# Patient Record
Sex: MALE | Race: WHITE | NOT HISPANIC OR LATINO | Employment: FULL TIME | URBAN - METROPOLITAN AREA
[De-identification: names, ages, dates, MRNs, and addresses within clinical notes are randomized per-mention and may not be internally consistent; named-entity substitution may affect disease eponyms.]

---

## 2023-04-28 ENCOUNTER — HOSPITAL ENCOUNTER (EMERGENCY)
Facility: HOSPITAL | Age: 43
Discharge: HOME | End: 2023-04-28
Attending: STUDENT IN AN ORGANIZED HEALTH CARE EDUCATION/TRAINING PROGRAM

## 2023-04-28 VITALS
WEIGHT: 315 LBS | TEMPERATURE: 99 F | OXYGEN SATURATION: 98 % | HEART RATE: 88 BPM | HEIGHT: 69 IN | RESPIRATION RATE: 18 BRPM | DIASTOLIC BLOOD PRESSURE: 91 MMHG | BODY MASS INDEX: 46.65 KG/M2 | SYSTOLIC BLOOD PRESSURE: 145 MMHG

## 2023-04-28 DIAGNOSIS — R52 PAIN: ICD-10-CM

## 2023-04-28 DIAGNOSIS — M10.9 ACUTE GOUT OF LEFT FOOT, UNSPECIFIED CAUSE: Primary | ICD-10-CM

## 2023-04-28 LAB
BASOPHILS # BLD AUTO: 0.05 X10(3)/MCL (ref 0–0.2)
BASOPHILS NFR BLD AUTO: 0.5 %
EOSINOPHIL # BLD AUTO: 0.09 X10(3)/MCL (ref 0–0.9)
EOSINOPHIL NFR BLD AUTO: 0.8 %
ERYTHROCYTE [DISTWIDTH] IN BLOOD BY AUTOMATED COUNT: 14.2 % (ref 11.5–17)
ERYTHROCYTE [SEDIMENTATION RATE] IN BLOOD: 12 MM/HR (ref 0–15)
HCT VFR BLD AUTO: 43.6 % (ref 42–52)
HGB BLD-MCNC: 13.9 G/DL (ref 14–18)
IMM GRANULOCYTES # BLD AUTO: 0.03 X10(3)/MCL (ref 0–0.04)
IMM GRANULOCYTES NFR BLD AUTO: 0.3 %
LYMPHOCYTES # BLD AUTO: 3.42 X10(3)/MCL (ref 0.6–4.6)
LYMPHOCYTES NFR BLD AUTO: 31 %
MCH RBC QN AUTO: 27.3 PG (ref 27–31)
MCHC RBC AUTO-ENTMCNC: 31.9 G/DL (ref 33–36)
MCV RBC AUTO: 85.5 FL (ref 80–94)
MONOCYTES # BLD AUTO: 0.71 X10(3)/MCL (ref 0.1–1.3)
MONOCYTES NFR BLD AUTO: 6.4 %
NEUTROPHILS # BLD AUTO: 6.74 X10(3)/MCL (ref 2.1–9.2)
NEUTROPHILS NFR BLD AUTO: 61 %
PLATELET # BLD AUTO: 274 X10(3)/MCL (ref 130–400)
PMV BLD AUTO: 10.7 FL (ref 7.4–10.4)
RBC # BLD AUTO: 5.1 X10(6)/MCL (ref 4.7–6.1)
URATE SERPL-MCNC: 7.9 MG/DL (ref 3.5–7.2)
WBC # SPEC AUTO: 11 X10(3)/MCL (ref 4.5–11.5)

## 2023-04-28 PROCEDURE — 96372 THER/PROPH/DIAG INJ SC/IM: CPT | Performed by: NURSE PRACTITIONER

## 2023-04-28 PROCEDURE — 85651 RBC SED RATE NONAUTOMATED: CPT | Performed by: NURSE PRACTITIONER

## 2023-04-28 PROCEDURE — 99284 EMERGENCY DEPT VISIT MOD MDM: CPT

## 2023-04-28 PROCEDURE — 84550 ASSAY OF BLOOD/URIC ACID: CPT | Performed by: NURSE PRACTITIONER

## 2023-04-28 PROCEDURE — 63600175 PHARM REV CODE 636 W HCPCS: Performed by: NURSE PRACTITIONER

## 2023-04-28 PROCEDURE — 85025 COMPLETE CBC W/AUTO DIFF WBC: CPT | Performed by: NURSE PRACTITIONER

## 2023-04-28 RX ORDER — PREDNISONE 20 MG/1
40 TABLET ORAL DAILY
Qty: 10 TABLET | Refills: 0 | Status: SHIPPED | OUTPATIENT
Start: 2023-04-28 | End: 2023-05-03

## 2023-04-28 RX ORDER — KETOROLAC TROMETHAMINE 30 MG/ML
60 INJECTION, SOLUTION INTRAMUSCULAR; INTRAVENOUS
Status: COMPLETED | OUTPATIENT
Start: 2023-04-28 | End: 2023-04-28

## 2023-04-28 RX ORDER — DEXAMETHASONE SODIUM PHOSPHATE 4 MG/ML
4 INJECTION, SOLUTION INTRA-ARTICULAR; INTRALESIONAL; INTRAMUSCULAR; INTRAVENOUS; SOFT TISSUE
Status: COMPLETED | OUTPATIENT
Start: 2023-04-28 | End: 2023-04-28

## 2023-04-28 RX ORDER — COLCHICINE 0.6 MG/1
1.2 TABLET ORAL DAILY
Qty: 3 TABLET | Refills: 0 | Status: SHIPPED | OUTPATIENT
Start: 2023-04-28

## 2023-04-28 RX ADMIN — DEXAMETHASONE SODIUM PHOSPHATE 4 MG: 4 INJECTION, SOLUTION INTRA-ARTICULAR; INTRALESIONAL; INTRAMUSCULAR; INTRAVENOUS; SOFT TISSUE at 06:04

## 2023-04-28 RX ADMIN — KETOROLAC TROMETHAMINE 60 MG: 60 INJECTION, SOLUTION INTRAMUSCULAR at 06:04

## 2023-04-29 NOTE — ED PROVIDER NOTES
Encounter Date: 4/28/2023       History     Chief Complaint   Patient presents with    Foot Pain     Pt c/o swelling and pain to ball of left foot starting this am.     43-year-old male presents emerged department complaints of nontraumatic left 2nd 3rd toe pain and swelling that started this morning.  Patient denies any injury trauma.  He denies any fevers chills.  He states pain is a sharp and throbbing pain that is worse with weight-bearing and alleviated some by rest.  He denies any other complaints or associated symptoms.    Review of patient's allergies indicates:  No Known Allergies  No past medical history on file.  No past surgical history on file.  No family history on file.     Review of Systems   Constitutional:  Negative for activity change, appetite change and fever.   HENT:  Negative for congestion, dental problem and sore throat.    Respiratory:  Negative for apnea, chest tightness and shortness of breath.    Cardiovascular:  Negative for chest pain.   Gastrointestinal:  Negative for nausea.   Genitourinary:  Negative for dysuria.   Musculoskeletal:  Negative for back pain.   Skin:  Negative for color change, rash and wound.   Neurological:  Negative for dizziness, facial asymmetry and weakness.   Hematological:  Does not bruise/bleed easily.   All other systems reviewed and are negative.    Physical Exam     Initial Vitals [04/28/23 1749]   BP Pulse Resp Temp SpO2   (!) 151/103 94 18 98.5 °F (36.9 °C) 96 %      MAP       --         Physical Exam    Nursing note and vitals reviewed.  Constitutional: Vital signs are normal. He appears well-developed and well-nourished.  Non-toxic appearance. He does not have a sickly appearance.   HENT:   Head: Normocephalic and atraumatic.   Eyes: Conjunctivae, EOM and lids are normal. Lids are everted and swept, no foreign bodies found.   Neck: Trachea normal and phonation normal. Neck supple. No thyroid mass and no thyromegaly present.   Normal range of motion.    Full passive range of motion without pain.     Cardiovascular:  Normal rate, regular rhythm, S1 normal, S2 normal, normal heart sounds, intact distal pulses and normal pulses.           Musculoskeletal:         General: Tenderness present.      Cervical back: Full passive range of motion without pain, normal range of motion and neck supple.     Lymphadenopathy:     He has no cervical adenopathy.   Neurological: He is alert and oriented to person, place, and time. He has normal strength. GCS score is 15. GCS eye subscore is 4. GCS verbal subscore is 5. GCS motor subscore is 6.   Skin: Skin is warm, dry and intact. Capillary refill takes less than 2 seconds. There is erythema.   Skin to 2nd 3rd toe to left is erythematous but there is no heat differences between the left and right foot.  There is no major edema noted.   Psychiatric: He has a normal mood and affect. His speech is normal and behavior is normal. Judgment normal. Cognition and memory are normal.       ED Course   Procedures  Labs Reviewed   URIC ACID - Abnormal; Notable for the following components:       Result Value    Uric Acid 7.9 (*)     All other components within normal limits   CBC WITH DIFFERENTIAL - Abnormal; Notable for the following components:    Hgb 13.9 (*)     MCHC 31.9 (*)     MPV 10.7 (*)     All other components within normal limits   SEDIMENTATION RATE, AUTOMATED - Normal   CBC W/ AUTO DIFFERENTIAL    Narrative:     The following orders were created for panel order CBC auto differential.  Procedure                               Abnormality         Status                     ---------                               -----------         ------                     CBC with Differential[[id number]]        Abnormal            Final result                 Please view results for these tests on the individual orders.          Imaging Results              X-Ray Foot Complete Left (Final result)  Result time 04/28/23 18:53:30      Final result  by Jonn Burciaga MD (04/28/23 18:53:30)                   Impression:      No acute abnormality of the left foot.      Electronically signed by: Jonn Burciaga MD  Date:    04/28/2023  Time:    18:53               Narrative:    EXAMINATION:  Three radiographic views of the LEFT FOOT.    CLINICAL HISTORY:  Pain, unspecified    TECHNIQUE:  Three radiographic views of the LEFT FOOT.    COMPARISON:  None.    FINDINGS:  Three views of the left foot demonstrate normal alignment.  There is no fracture.  There is no bony mass lesion.  There is no soft tissue swelling.                                       Medications   ketorolac injection 60 mg (60 mg Intramuscular Given 4/28/23 1825)   dexAMETHasone injection 4 mg (4 mg Intramuscular Given 4/28/23 1825)                       Medical Decision Making  43-year-old male with nontraumatic left 2nd 3rd toe pain.    Problems Addressed:  Acute gout of left foot, unspecified cause: acute illness or injury     Details: Patient has elevated uric acid in signs and symptoms of gout.  IM medications given here and p.o. medicines sent home to continue for symptoms.  Discussed getting established with PCP for follow-up.  Discussed including paperwork for Rena EDOUARD for follow-up.  Patient was aware plan of care and had no other questions or concerns prior to discharge.    Amount and/or Complexity of Data Reviewed  External Data Reviewed: labs and notes.  Labs: ordered. Decision-making details documented in ED Course.  Radiology: ordered. Decision-making details documented in ED Course.            Clinical Impression:   Final diagnoses:  [R52] Pain  [M10.9] Acute gout of left foot, unspecified cause (Primary)        ED Disposition Condition    Discharge Stable          ED Prescriptions       Medication Sig Dispense Start Date End Date Auth. Provider    predniSONE (DELTASONE) 20 MG tablet Take 2 tablets (40 mg total) by mouth once daily. for 5 days 10 tablet 4/28/2023 5/3/2023 Berny BANERJEE  SILKE Dasilva    colchicine (COLCRYS) 0.6 mg tablet Take 2 tablets (1.2 mg total) by mouth once daily. Take 2 tablets x1 dose then may repeat 1 single dose of 0.6 mg p.o. 1 hour later x1 dose. 3 tablet 4/28/2023 -- SILKE Lopez          Follow-up Information    None          SILKE Lopez  04/28/23 1920

## 2024-12-04 ENCOUNTER — HOSPITAL ENCOUNTER (EMERGENCY)
Facility: HOSPITAL | Age: 44
Discharge: HOME | End: 2024-12-04
Attending: INTERNAL MEDICINE

## 2024-12-04 VITALS
RESPIRATION RATE: 18 BRPM | HEART RATE: 78 BPM | TEMPERATURE: 98 F | HEIGHT: 68 IN | DIASTOLIC BLOOD PRESSURE: 78 MMHG | BODY MASS INDEX: 47.74 KG/M2 | WEIGHT: 315 LBS | SYSTOLIC BLOOD PRESSURE: 130 MMHG | OXYGEN SATURATION: 99 %

## 2024-12-04 DIAGNOSIS — R05.9 COUGH: ICD-10-CM

## 2024-12-04 DIAGNOSIS — J02.9 VIRAL PHARYNGITIS: Primary | ICD-10-CM

## 2024-12-04 LAB
FLUAV AG UPPER RESP QL IA.RAPID: NOT DETECTED
FLUBV AG UPPER RESP QL IA.RAPID: NOT DETECTED
SARS-COV-2 RNA RESP QL NAA+PROBE: NOT DETECTED
STREP A PCR (OHS): NOT DETECTED

## 2024-12-04 PROCEDURE — 25000003 PHARM REV CODE 250: Performed by: PHYSICIAN ASSISTANT

## 2024-12-04 PROCEDURE — 87651 STREP A DNA AMP PROBE: CPT | Performed by: PHYSICIAN ASSISTANT

## 2024-12-04 PROCEDURE — 99283 EMERGENCY DEPT VISIT LOW MDM: CPT | Mod: 25

## 2024-12-04 PROCEDURE — 0240U COVID/FLU A&B PCR: CPT | Performed by: PHYSICIAN ASSISTANT

## 2024-12-04 RX ORDER — IBUPROFEN 800 MG/1
800 TABLET ORAL EVERY 6 HOURS PRN
Qty: 20 TABLET | Refills: 0 | Status: SHIPPED | OUTPATIENT
Start: 2024-12-04

## 2024-12-04 RX ORDER — FLUTICASONE PROPIONATE 50 MCG
1 SPRAY, SUSPENSION (ML) NASAL 2 TIMES DAILY PRN
Qty: 15 G | Refills: 0 | Status: SHIPPED | OUTPATIENT
Start: 2024-12-04

## 2024-12-04 RX ORDER — CETIRIZINE HYDROCHLORIDE 10 MG/1
10 TABLET ORAL DAILY
Qty: 30 TABLET | Refills: 0 | Status: SHIPPED | OUTPATIENT
Start: 2024-12-04 | End: 2025-01-03

## 2024-12-04 RX ORDER — KETOROLAC TROMETHAMINE 10 MG/1
10 TABLET, FILM COATED ORAL
Status: COMPLETED | OUTPATIENT
Start: 2024-12-04 | End: 2024-12-04

## 2024-12-04 RX ADMIN — KETOROLAC TROMETHAMINE 10 MG: 10 TABLET, FILM COATED ORAL at 07:12

## 2024-12-04 NOTE — Clinical Note
"Zeferino HOLCOMB"Perfecto was seen and treated in our emergency department on 12/4/2024.  He may return to work on 12/07/2024.       If you have any questions or concerns, please don't hesitate to call.      Ivelisse Painting PA"

## 2024-12-05 NOTE — ED PROVIDER NOTES
Encounter Date: 12/4/2024       History     Chief Complaint   Patient presents with    Cough     C/O cough w/ fever x 4-5 days. Resp E/U. Skin W/D. NADN.      44-year-old male presents to ED for evaluation of cough and congestion over the past 4-5 days.  Patient reports fever at home.  States he has a fever of 102 yesterday.  Complains of sore throat.  Denies any shortness of breath or chest pain.  No medications taken at home.    The history is provided by the patient. No  was used.     Review of patient's allergies indicates:  No Known Allergies  History reviewed. No pertinent past medical history.  No past surgical history on file.  No family history on file.     Review of Systems   Constitutional:  Positive for fatigue and fever.   HENT:  Positive for congestion and sore throat. Negative for ear pain and rhinorrhea.    Respiratory:  Positive for cough. Negative for shortness of breath and wheezing.    Cardiovascular:  Negative for chest pain.   Gastrointestinal:  Negative for abdominal pain, nausea and vomiting.   Musculoskeletal:  Negative for myalgias.   Neurological:  Negative for headaches.   All other systems reviewed and are negative.      Physical Exam     Initial Vitals [12/04/24 1915]   BP Pulse Resp Temp SpO2   -- 82 20 98.5 °F (36.9 °C) 98 %      MAP       --         Physical Exam    Nursing note and vitals reviewed.  Constitutional: He appears well-developed and well-nourished. He is cooperative.   HENT:   Head: Normocephalic and atraumatic.   Right Ear: Tympanic membrane and external ear normal.   Left Ear: Tympanic membrane and external ear normal. Mouth/Throat: Uvula is midline, oropharynx is clear and moist and mucous membranes are normal. No trismus in the jaw. No uvula swelling.   Eyes: Conjunctivae are normal. Pupils are equal, round, and reactive to light.   Neck: Neck supple.   Normal range of motion.  Cardiovascular:  Normal rate, regular rhythm and normal heart sounds.            Pulmonary/Chest: Breath sounds normal. No respiratory distress. He has no wheezes. He has no rhonchi. He has no rales.   Abdominal: Abdomen is soft. Bowel sounds are normal. There is no abdominal tenderness.   Musculoskeletal:         General: Normal range of motion.      Cervical back: Normal range of motion and neck supple.     Neurological: He is alert and oriented to person, place, and time.   Skin: Skin is warm and dry. Capillary refill takes less than 2 seconds.   Psychiatric: He has a normal mood and affect.         ED Course   Procedures  Labs Reviewed   COVID/FLU A&B PCR - Normal       Result Value    Influenza A PCR Not Detected      Influenza B PCR Not Detected      SARS-CoV-2 PCR Not Detected      Narrative:     The Xpert Xpress SARS-CoV-2/FLU/RSV plus is a rapid, multiplexed real-time PCR test intended for the simultaneous qualitative detection and differentiation of SARS-CoV-2, Influenza A, Influenza B, and respiratory syncytial virus (RSV) viral RNA in either nasopharyngeal swab or nasal swab specimens.         STREP GROUP A BY PCR - Normal    STREP A PCR ([facility]) Not Detected      Narrative:     The Xpert Xpress Strep A test is a rapid, qualitative in vitro diagnostic test for the detection of Streptococcus pyogenes (Group A ß-hemolytic Streptococcus, Strep A) in throat swab specimens from patients with signs and symptoms of pharyngitis.            Imaging Results              X-Ray Chest PA And Lateral (Preliminary result)  Result time 12/04/24 20:14:48      Wet Read by Ivelisse Painting PA (12/04/24 20:14:48, [facility] Emergency Dept, Emergency Medicine)    No acute findings noted.                                      Medications   ketorolac tablet 10 mg (10 mg Oral Given 12/4/24 1929)     Medical Decision Making   44-year-old male presents to ED for evaluation of cough and congestion over the past 4-5 days.  Patient reports fever at home.  States he has a fever of 102 yesterday.   Complains of sore throat.  Denies any shortness of breath or chest pain.  No medications taken at home.    Differential diagnosis includes but isn't limited to Viral syndrome, COVID, flu, strep, sinusitis, bronchitis      Amount and/or Complexity of Data Reviewed  Labs: ordered. Decision-making details documented in ED Course.  Radiology: ordered and independent interpretation performed.  Discussion of management or test interpretation with external provider(s):   Patient afebrile and in no acute distress presents to ED for evaluation of URI like symptoms.  COVID and flu negative.  Strep negative.  X-ray obtained showing no acute pneumonia.  Will treat symptomatically at this time.  Patient requesting work excuse.  Will give a few days have follow up with PCP for further evaluation.    Risk  OTC drugs.  Prescription drug management.               ED Course as of 12/04/24 2020   Wed Dec 04, 2024   2003 STREP A PCR ([facility]): Not Detected [SL]   2011 Influenza A, Molecular: Not Detected [SL]   2011 Influenza B, Molecular: Not Detected [SL]   2011 SARS-CoV2 (COVID-19) Qualitative PCR: Not Detected [SL]      ED Course User Index  [SL] Ivelisse Painting PA                           Clinical Impression:  Final diagnoses:  [R05.9] Cough  [J02.9] Viral pharyngitis (Primary)          ED Disposition Condition    Discharge Stable          ED Prescriptions       Medication Sig Dispense Start Date End Date Auth. Provider    ibuprofen (ADVIL,MOTRIN) 800 MG tablet Take 1 tablet (800 mg total) by mouth every 6 (six) hours as needed for Pain. 20 tablet 12/4/2024 -- Ivelisse Painting PA    fluticasone propionate (FLONASE) 50 mcg/actuation nasal spray 1 spray (50 mcg total) by Each Nostril route 2 (two) times daily as needed for Rhinitis. 15 g 12/4/2024 -- Ivelisse Painting PA    cetirizine (ZYRTEC) 10 MG tablet Take 1 tablet (10 mg total) by mouth once daily. 30 tablet 12/4/2024 1/3/2025 Ivelisse Painting PA          Follow-up Information       Follow up With  Specialties Details Why Contact Info    PCP  In 1 week As needed, If you do not have a PCP you may call [phone] to help get set up If you do not have a PCP you may call [phone] to help get set up.             Ivelisse Painting PA  12/04/24 2020

## 2025-04-16 ENCOUNTER — HOSPITAL ENCOUNTER (EMERGENCY)
Facility: HOSPITAL | Age: 45
Discharge: HOME | End: 2025-04-16
Attending: INTERNAL MEDICINE

## 2025-04-16 VITALS
SYSTOLIC BLOOD PRESSURE: 136 MMHG | TEMPERATURE: 98 F | BODY MASS INDEX: 45.1 KG/M2 | DIASTOLIC BLOOD PRESSURE: 92 MMHG | OXYGEN SATURATION: 98 % | HEIGHT: 70 IN | RESPIRATION RATE: 19 BRPM | WEIGHT: 315 LBS | HEART RATE: 89 BPM

## 2025-04-16 DIAGNOSIS — M25.562 LEFT KNEE PAIN: ICD-10-CM

## 2025-04-16 PROCEDURE — 99284 EMERGENCY DEPT VISIT MOD MDM: CPT | Mod: 25

## 2025-04-16 PROCEDURE — 25000003 PHARM REV CODE 250: Performed by: PHYSICIAN ASSISTANT

## 2025-04-16 RX ORDER — DICLOFENAC SODIUM 50 MG/1
50 TABLET, DELAYED RELEASE ORAL 3 TIMES DAILY
Qty: 21 TABLET | Refills: 0 | Status: SHIPPED | OUTPATIENT
Start: 2025-04-16 | End: 2025-04-23

## 2025-04-16 RX ORDER — KETOROLAC TROMETHAMINE 10 MG/1
10 TABLET, FILM COATED ORAL
Status: COMPLETED | OUTPATIENT
Start: 2025-04-16 | End: 2025-04-16

## 2025-04-16 RX ORDER — METHOCARBAMOL 750 MG/1
750 TABLET, FILM COATED ORAL 3 TIMES DAILY
Qty: 21 TABLET | Refills: 0 | Status: SHIPPED | OUTPATIENT
Start: 2025-04-16 | End: 2025-04-23

## 2025-04-16 RX ADMIN — KETOROLAC TROMETHAMINE 10 MG: 10 TABLET, FILM COATED ORAL at 06:04

## 2025-04-16 NOTE — ED PROVIDER NOTES
Encounter Date: 4/16/2025       History     Chief Complaint   Patient presents with    Knee Pain     Pt c/o left knee pain x one month, no injury reported.     45-year-old male presents to ED for evaluation of left knee pain over the last month.  Patient denies any trauma or injury.  Patient states he took Tylenol at home without relief.  States that his pain in his not getting any better.  States that he does not follow up with the primary care doctor.  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    The history is provided by the patient. No  was used.     Review of patient's allergies indicates:  No Known Allergies  History reviewed. No pertinent past medical history.  History reviewed. No pertinent surgical history.  No family history on file.  Social History[1]  Review of Systems   Constitutional:  Negative for chills, fatigue and fever.   HENT:  Negative for sore throat.    Respiratory:  Negative for cough and shortness of breath.    Cardiovascular:  Negative for chest pain.   Gastrointestinal:  Negative for abdominal pain, nausea and vomiting.   Genitourinary:  Negative for dysuria and frequency.   Musculoskeletal:  Positive for arthralgias, joint swelling and myalgias. Negative for back pain and neck pain.   Skin:  Negative for rash.   Neurological:  Negative for dizziness, weakness and headaches.   Hematological:  Does not bruise/bleed easily.   All other systems reviewed and are negative.      Physical Exam     Initial Vitals [04/16/25 1754]   BP Pulse Resp Temp SpO2   (!) 138/96 (!) 115 20 98.1 °F (36.7 °C) 97 %      MAP       --         Physical Exam    Nursing note and vitals reviewed.  Constitutional: He appears well-developed. He is cooperative.   HENT:   Head: Normocephalic and atraumatic.   Right Ear: Tympanic membrane and external ear normal.   Left Ear: Tympanic membrane and external ear normal. Mouth/Throat: Uvula is midline, oropharynx is clear  and moist and mucous membranes are normal. No trismus in the jaw. No uvula swelling.   Eyes: Conjunctivae are normal. Pupils are equal, round, and reactive to light.   Neck: Neck supple.   Normal range of motion.  Cardiovascular:  Normal rate, regular rhythm and normal heart sounds.           Pulmonary/Chest: Breath sounds normal. No respiratory distress. He has no wheezes. He has no rhonchi. He has no rales.   Abdominal: Abdomen is soft. Bowel sounds are normal. There is no abdominal tenderness.   Musculoskeletal:         General: Normal range of motion.      Cervical back: Normal range of motion and neck supple.      Left knee: No swelling or deformity. Normal range of motion. Tenderness present.      Comments: DP pulses 2+ all other adjacent joints negative.     Neurological: He is alert and oriented to person, place, and time. He has normal strength. No cranial nerve deficit or sensory deficit. GCS score is 15. GCS eye subscore is 4. GCS verbal subscore is 5. GCS motor subscore is 6.   Skin: Skin is warm and dry. Capillary refill takes less than 2 seconds.   Psychiatric: He has a normal mood and affect.         ED Course   Procedures  Labs Reviewed - No data to display       Imaging Results              X-Ray Knee Complete 4 or More Views Left (Preliminary result)  Result time 04/16/25 19:08:34      Wet Read by Ivelisse Painting PA (04/16/25 19:08:34, [facility])    No acute fracture or dislocation noted                                     Medications   ketorolac tablet 10 mg (10 mg Oral Given 4/16/25 8971)     Medical Decision Making  45-year-old male presents to ED for evaluation of left knee pain over the last month.  Patient denies any trauma or injury.  Patient states he took Tylenol at home without relief.  States that his pain in his not getting any better.  States that he does not follow up with the primary care doctor.  Reports that he is driving and can not  take any narcotic pain medication.    Differential diagnosis includes but isn't limited to knee pain, internal knee injury, sprain, strain, degenerative joint patient    Amount and/or Complexity of Data Reviewed  Radiology: ordered.  Discussion of management or test interpretation with external provider(s): Patient GCS 15 and neuro intact.  Ambulatory with steady gait.  Presents to ED for evaluation of atraumatic left knee pain.  Patient states it has been ongoing for the last month.  Does not see if PCP.  Has been taking Tylenol without relief.  X-ray obtained without any acute findings.  Will discharge home with short course of symptomatic care.  Discussed with patient need for follow-up for further evaluation as needed.  Patient verbalizes understanding and agrees with plan of care.    Risk  OTC drugs.  Prescription drug management.                                      Clinical Impression:  Final diagnoses:  [M25.562] Left knee pain          ED Disposition Condition    Discharge Stable          ED Prescriptions       Medication Sig Dispense Start Date End Date Auth. Provider    diclofenac (VOLTAREN) 50 MG EC tablet Take 1 tablet (50 mg total) by mouth 3 (three) times daily. for 7 days 21 tablet 4/16/2025 4/23/2025 Ivelisse Painting PA    methocarbamoL (ROBAXIN) 750 MG Tab Take 1 tablet (750 mg total) by mouth 3 (three) times daily. for 7 days 21 tablet 4/16/2025 4/23/2025 Ivelisse Painting PA          Follow-up Information       Follow up With Specialties Details Why Contact Info    PCP  In 1 week As needed, If you do not have a PCP you may call [phone] to help get set up If you do not have a PCP you may call [phone] to help get set up.               [1]   Social History  Tobacco Use    Smoking status: Never    Smokeless tobacco: Never        Ivelisse Painting PA  04/16/25 5075

## 2025-04-16 NOTE — DISCHARGE INSTRUCTIONS
Refill Authorization Note   Fabienne Cummings  is requesting a refill authorization.  Brief Assessment and Rationale for Refill:  Approve     Medication Therapy Plan:       Medication Reconciliation Completed: No   Comments:   --->Care Gap information included below if applicable.   Orders Placed This Encounter    montelukast (SINGULAIR) 10 mg tablet      Requested Prescriptions   Signed Prescriptions Disp Refills    montelukast (SINGULAIR) 10 mg tablet 90 tablet 2     Sig: TAKE 1 TABLET BY MOUTH EVERY DAY IN THE EVENING       Pulmonology:  Leukotriene Inhibitors Passed - 3/15/2022  3:44 PM        Passed - Patient is at least 18 years old        Passed - Negative Pregnancy Status Check        Passed - Valid encounter within last 15 months     Recent Visits  Date Type Provider Dept   11/16/21 Office Visit Ladi Noyola MD [facility]   11/16/20 Office Visit Ladi Noyola MD [facility]   Showing recent visits within past 720 days and meeting all other requirements  Future Appointments  No visits were found meeting these conditions.  Showing future appointments within next 150 days and meeting all other requirements      Future Appointments              In 2 months Ladi Noyola MD [facility] [place]                    Appointments  past 12m or future 3m with PCP    Date Provider   Last Visit   11/16/2021 Ladi Noyola MD   Next Visit   5/17/2022 Ladi Noyola MD   ED visits in past 90 days: 0     Note composed:3:45 PM 03/15/2022          Use ice and elevation, 20 minutes on and 20 minutes off.  Take diclofenac for pain and swelling. May use robaxin for muscle pain and tightness.  Follow up with PCP for further evaluation of knee pain.  If you do not have a PCP call [phone] to get set up with a PCP,

## 2025-05-22 ENCOUNTER — HOSPITAL ENCOUNTER (EMERGENCY)
Facility: HOSPITAL | Age: 45
Discharge: HOME | End: 2025-05-22
Attending: STUDENT IN AN ORGANIZED HEALTH CARE EDUCATION/TRAINING PROGRAM

## 2025-05-22 VITALS
SYSTOLIC BLOOD PRESSURE: 131 MMHG | DIASTOLIC BLOOD PRESSURE: 93 MMHG | HEIGHT: 70 IN | OXYGEN SATURATION: 97 % | BODY MASS INDEX: 45.1 KG/M2 | HEART RATE: 76 BPM | TEMPERATURE: 98 F | WEIGHT: 315 LBS | RESPIRATION RATE: 17 BRPM

## 2025-05-22 DIAGNOSIS — M25.562 LEFT KNEE PAIN, UNSPECIFIED CHRONICITY: Primary | ICD-10-CM

## 2025-05-22 DIAGNOSIS — S83.92XA SPRAIN OF LEFT KNEE, UNSPECIFIED LIGAMENT, INITIAL ENCOUNTER: ICD-10-CM

## 2025-05-22 DIAGNOSIS — M25.569 KNEE PAIN: ICD-10-CM

## 2025-05-22 PROCEDURE — 99284 EMERGENCY DEPT VISIT MOD MDM: CPT | Mod: 25

## 2025-05-22 PROCEDURE — 63600175 PHARM REV CODE 636 W HCPCS: Mod: JZ,TB | Performed by: NURSE PRACTITIONER

## 2025-05-22 PROCEDURE — 96372 THER/PROPH/DIAG INJ SC/IM: CPT | Performed by: NURSE PRACTITIONER

## 2025-05-22 RX ORDER — IBUPROFEN 600 MG/1
600 TABLET, FILM COATED ORAL EVERY 8 HOURS PRN
Qty: 15 TABLET | Refills: 0 | Status: SHIPPED | OUTPATIENT
Start: 2025-05-22 | End: 2025-05-22

## 2025-05-22 RX ORDER — KETOROLAC TROMETHAMINE 30 MG/ML
60 INJECTION, SOLUTION INTRAMUSCULAR; INTRAVENOUS
Status: COMPLETED | OUTPATIENT
Start: 2025-05-22 | End: 2025-05-22

## 2025-05-22 RX ORDER — IBUPROFEN 600 MG/1
600 TABLET, FILM COATED ORAL EVERY 8 HOURS PRN
Qty: 15 TABLET | Refills: 0 | Status: SHIPPED | OUTPATIENT
Start: 2025-05-22

## 2025-05-22 RX ADMIN — KETOROLAC TROMETHAMINE 60 MG: 60 INJECTION, SOLUTION INTRAMUSCULAR at 11:05

## 2025-05-22 NOTE — ED PROVIDER NOTES
Encounter Date: 5/22/2025       History     Chief Complaint   Patient presents with    Joint Swelling     Patient reports left knee swelling/pain x 2 months after twisting knee.      Patient states that approximately 2 months ago he was getting out of his truck whenever he stepped into a hole and twisted his left knee.  Patient states that he has had left knee pain and intermittent swelling since then.  Denies any other injury or trauma.  Patient states that pain worsens with movement and palpation.  Denies any loss of range of motion or sensation.  Patient denies any past medical history.    The history is provided by the patient.     Review of patient's allergies indicates:  No Known Allergies  No past medical history on file.  No past surgical history on file.  No family history on file.  Social History[1]  Review of Systems   Constitutional: Negative.    HENT: Negative.     Eyes: Negative.    Respiratory: Negative.     Cardiovascular: Negative.    Gastrointestinal: Negative.    Endocrine: Negative.    Genitourinary: Negative.    Musculoskeletal:         Knee pain.   Skin: Negative.  Negative for wound.   Allergic/Immunologic: Negative.    Neurological: Negative.  Negative for weakness and numbness.   Hematological: Negative.    Psychiatric/Behavioral: Negative.     All other systems reviewed and are negative.      Physical Exam     Initial Vitals [05/22/25 1121]   BP Pulse Resp Temp SpO2   137/88 90 14 97.8 °F (36.6 °C) 99 %      MAP       --         Physical Exam    Nursing note and vitals reviewed.  Constitutional: He appears well-developed and well-nourished. No distress.   HENT:   Head: Normocephalic and atraumatic. Mouth/Throat: Oropharynx is clear and moist.   Eyes: Conjunctivae and EOM are normal. Pupils are equal, round, and reactive to light.   Neck: Neck supple.   Normal range of motion.  Cardiovascular:  Normal rate, regular rhythm, normal heart sounds and intact distal pulses.            Pulmonary/Chest: Breath sounds normal. No respiratory distress. He has no wheezes.   Abdominal: Abdomen is soft. He exhibits no distension. There is no abdominal tenderness.   Musculoskeletal:         General: No edema. Normal range of motion.      Cervical back: Normal range of motion and neck supple.      Right knee: Normal.      Left knee: No swelling or deformity. Normal range of motion. Tenderness present over the lateral joint line. Normal alignment.        Legs:      Neurological: He is alert and oriented to person, place, and time. He has normal strength. Gait normal. GCS score is 15. GCS eye subscore is 4. GCS verbal subscore is 5. GCS motor subscore is 6.   Skin: Skin is warm and dry. No rash noted.   Psychiatric: He has a normal mood and affect. Thought content normal.         ED Course   Procedures  Labs Reviewed - No data to display       Imaging Results              X-Ray Knee Complete 4 or More Views Left (Final result)  Result time 05/22/25 12:33:26      Final result by Zeferino Hill MD (05/22/25 12:33:26)                   Impression:      Four views of the left knee. Question deep lateral femoral notch which is associated with ACL injury.  Outpatient MRI could further evaluate if desired.  There is a small suprapatellar joint effusion. Mild joint space narrowing medial compartment.      Electronically signed by: Zeferino Hill  Date:    05/22/2025  Time:    12:33               Narrative:    EXAMINATION:  XR KNEE COMP 4 OR MORE VIEWS LEFT    CLINICAL HISTORY:  Pain in unspecified knee    COMPARISON:  16 April 2025                                       Medications   ketorolac injection 60 mg (60 mg Intramuscular Given 5/22/25 1158)     Medical Decision Making  Patient states that approximately 2 months ago he was getting out of his truck whenever he stepped into a hole and twisted his left knee.  Patient states that he has had left knee pain and intermittent swelling since then.  Denies any other  injury or trauma.  Patient states that pain worsens with movement and palpation.  Denies any loss of range of motion or sensation.  Patient denies any past medical history.    The history is provided by the patient.       Amount and/or Complexity of Data Reviewed  Radiology: ordered. Decision-making details documented in ED Course.  Discussion of management or test interpretation with external provider(s): Differential diagnosis (including but not limited to):   Judging by the patient's chief complaint and pertinent history, the patient has the following possible differential diagnoses, including but not limited to the following.  Some of these are deemed to be lower likelihood and some more likely based on my physical exam and history combined with possible lab work and/or imaging studies.   Please see the pertinent studies, and refer to the HPI.  Some of these diagnoses will take further evaluation to fully rule out, perhaps as an outpatient and the patient was encouraged to follow up when discharged for more comprehensive evaluation.  Fracture, sprain, strain, knee pain  Patient's x-ray of his left knee shows a possible deep lateral femoral notch which is associated with a possible ACL injury.  Suggest outpatient MRI.  There is a small suprapatellar joint effusion with mild joint space narrowing to the medial compartment.  Discussed results with patient.  Patient was given Toradol IM for pain in the ED. discussed with patient that he will need to follow up with ortho for further evaluation his left knee.  We will send a referral to the orthopedic clinic at [facility].  We will discharge with pain control.  ED return precautions were given.      Risk  Prescription drug management.                                      Clinical Impression:  Final diagnoses:  [M25.569] Knee pain  [M25.562] Left knee pain, unspecified chronicity (Primary)  [S83.92XA] Sprain of left knee, unspecified ligament, initial encounter          ED  Disposition Condition    Discharge Stable          ED Prescriptions       Medication Sig Dispense Start Date End Date Auth. Provider    ibuprofen (ADVIL,MOTRIN) 600 MG tablet Take 1 tablet (600 mg total) by mouth every 8 (eight) hours as needed for Pain. 15 tablet 5/22/2025 -- Leeann Acevedo FNP          Follow-up Information       Follow up With Specialties Details Why Contact Info    [facility] will call you with a follow-up appointment.  In 1 week      Primary Care Provider  In 3 days      Primary Care Provider  In 3 days                     [1]   Social History  Tobacco Use    Smoking status: Never    Smokeless tobacco: Never        Leeann Acevedo FNP  05/22/25 7300

## 2025-05-22 NOTE — Clinical Note
"Zeferino Loyola"Perfecto was seen and treated in our emergency department on 5/22/2025.  He may return to work on 05/24/2025.       If you have any questions or concerns, please don't hesitate to call.      Leeann Acevedo, SONYAP"

## 2025-06-02 ENCOUNTER — OFFICE VISIT (OUTPATIENT)
Dept: ORTHOPEDICS | Facility: CLINIC | Age: 45
End: 2025-06-02

## 2025-06-02 ENCOUNTER — HOSPITAL ENCOUNTER (OUTPATIENT)
Dept: RADIOLOGY | Facility: HOSPITAL | Age: 45
Discharge: HOME | End: 2025-06-02

## 2025-06-02 VITALS
RESPIRATION RATE: 19 BRPM | WEIGHT: 315 LBS | HEIGHT: 70 IN | HEART RATE: 74 BPM | DIASTOLIC BLOOD PRESSURE: 84 MMHG | OXYGEN SATURATION: 99 % | SYSTOLIC BLOOD PRESSURE: 124 MMHG | BODY MASS INDEX: 45.1 KG/M2 | TEMPERATURE: 98 F

## 2025-06-02 DIAGNOSIS — M23.92 INTERNAL DERANGEMENT OF LEFT KNEE: Primary | ICD-10-CM

## 2025-06-02 DIAGNOSIS — M23.92 INTERNAL DERANGEMENT OF LEFT KNEE: ICD-10-CM

## 2025-06-02 DIAGNOSIS — S83.92XA SPRAIN OF LEFT KNEE, UNSPECIFIED LIGAMENT, INITIAL ENCOUNTER: ICD-10-CM

## 2025-06-02 PROCEDURE — 99215 OFFICE O/P EST HI 40 MIN: CPT | Mod: PBBFAC,25

## 2025-06-02 PROCEDURE — 73564 X-RAY EXAM KNEE 4 OR MORE: CPT | Mod: TC,LT

## 2025-06-02 RX ORDER — MELOXICAM 7.5 MG/1
7.5 TABLET ORAL 2 TIMES DAILY PRN
Qty: 60 TABLET | Refills: 1 | Status: SHIPPED | OUTPATIENT
Start: 2025-06-02 | End: 2025-07-02

## 2025-06-02 RX ORDER — DICLOFENAC SODIUM 10 MG/G
2 GEL TOPICAL 4 TIMES DAILY
Qty: 100 G | Refills: 3 | Status: SHIPPED | OUTPATIENT
Start: 2025-06-02